# Patient Record
(demographics unavailable — no encounter records)

---

## 2017-02-10 NOTE — UC
Respiratory Complaint HPI





- HPI Summary


HPI Summary: 





cough, scratchy throat, sweats at night and cough worse at night x 6 months.  

No fever.  No sputum. 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  He does not smoke cigarettes. No 

hx of TB and no known exposure to TB. Does not know if he has had BCG.  Pt 

speaks primarily Algerian, although he does speak English.  I also speak and 

understand some Algerian.  Pt chooses not to use  services and states 

he understands me and I in turn voice that I understand him.  There is a male co

-worker also present in the room who witnesses this interaction.  





- History of Current Complaint


Stated Complaint: CONGESTION


Time Seen by Provider: 02/10/17 18:42


Hx Obtained From: Patient, Family/Caretaker - male co-worker


Onset/Duration: Gradual Onset, Lasting Weeks - 6months, Still Present


Timing: Constant


Severity Initially: Moderate


Severity Currently: Moderate


Pain Intensity: 0


Pain Scale Used: 0-10 Numeric


Character: Cough: Nonproductive


Aggravating Factors: Allergens - possible, but OTC allergy meds did not help


Alleviating Factors: Nothing


Associated Signs And Symptoms: Negative: Fever, Chills, Nasal Congestion





- Risk Factors


Pulmonary Embolism Risk Factors: Negative


Cardiac Risk Factors: Negative


Pseudomonas Risk Factors: Negative


Tuberculosis Risk Factors: Negative





- Allergies/Home Medications


Allergies/Adverse Reactions: 


 Allergies











Allergy/AdvReac Type Severity Reaction Status Date / Time


 


antibiotic/unknown Allergy Intermediate Diarrhea Uncoded 02/09/15 17:40














PMH/Surg Hx/FS Hx/Imm Hx


Previously Healthy: Yes





- Surgical History


Surgical History: None





- Family History


Known Family History: 


   Negative: Respiratory Disease





- Social History


Occupation: Employed Full-time - migrant  originally from [facility]


Alcohol Use: None


Substance Use Type: None


Smoking Status (MU): Never Smoked Tobacco





Review of Systems


Constitutional: Other - sweats


Skin: Negative


Eyes: Negative


ENT: Negative


Respiratory: Cough


Cardiovascular: Negative


Gastrointestinal: Negative


Genitourinary: Negative


Motor: Negative


Neurovascular: Negative


Musculoskeletal: Negative


Neurological: Negative


Psychological: Negative


All Other Systems Reviewed And Are Negative: Yes





Physical Exam


Triage Information Reviewed: Yes


Appearance: Well-Appearing, No Pain Distress, Well-Nourished


Vital Signs: 





bradycardia noted, states it is normal for him. 


Vital Signs Reviewed: Yes


Eyes: Positive: Conjunctiva Clear


ENT: Positive: Pharynx normal, TMs normal


Neck: Positive: Supple, Nontender, No Lymphadenopathy


Respiratory: Positive: Chest non-tender, Lungs clear, Normal breath sounds, No 

respiratory distress


Cardiovascular: Positive: No Murmur, Pulses Normal, Brisk Capillary Refill, 

Bradycardia


Musculoskeletal: Positive: Strength Intact, ROM Intact


Neurological: Positive: Alert, Muscle Tone Normal


Psychological Exam: Normal


Skin: Positive: Other - thumbnail on left and index finger on right with brown 

vertical streak on fingernail; no clubbing





Respiratory Course/Dx





- Course


Course Of Treatment: discussed diff dx and possibility of doing xray.  Pt does 

not have health insurance and prefers not to have chest xray done at this time 

after I advised him that the physical exam was normal for the lungs.  We also 

discussed possible TB with cough worse at night and sweats.  Pt has not sputum 

and no fever and no known exposure, has not been back to [facility] for 6 years.

  Pt agrees to try empiric treatment for possible allergies (such as dust and 

mold that he may be exposed to on the farm) and possible bronchospasm/asthma/

bronchitis.  I advised him that if he is no better in 10 days, that he should 

seek medical attention again.  Pt and the coworker voice understanding.  Pt's 

discharge papers are printed in Algerian for him.





- Differential Dx/Diagnosis


Differential Diagnosis/HQI/PQRI: Asthma, Bronchitis, Lower Resp Infection, 

Tuberculosis, Other - allergies


Provider Diagnoses: allergies.  bronchospasm.  asthma.  bronchitis





Discharge





- Discharge Plan


Condition: Stable


Disposition: HOME


Prescriptions: 


Albuterol HFA INHALER* [Ventolin HFA Inhaler*] 2 puff INH Q4H PRN #1 mdi


 PRN Reason: Cough


Azithromycin TAB* [Zithromax TAB (Z-EBER) 250 mg #6 tabs] 2 tab PO .TODAY, THEN 

1 DAILY #1 eber


LevoCETirizine TAB (NF) [Xyzal TAB (NF)] 5 mg PO DAILY #30 tab


predniSONE TAB* [Deltasone TAB*] 40 mg PO DAILY #10 tab


Patient Education Materials:  Asthma (ED), Upper Respiratory Infection (ED), 

Allergies (ED), Bronchospasm (ED)


Print Language: Faroese


Referrals: 


No Primary Care Phys,NOPCP [Primary Care Provider] - 


Additional Instructions: 


We have given you a list of providers that are accepting new patients.  


Dr. Salazar recommends that you should seek follow up medical attention in 10-14 

days if you are not improved.  You should return sooner if you develop new or 

worsening symptoms.

## 2017-11-13 NOTE — UC
Lower Extremity/Ankle HPI





- HPI Summary


HPI Summary: 





25 y/o male presents to the urgent care c/o left lower leg pain and severe 

swelling s/p injury playing soccer yesterday night. Pt reports he was kicked in 

the medial aspect of his lef leg by a player. He applied ice and took an 

ibuprofen PO yesterday.  However this morning he woke up with severe swelling 

and redness around the leg. Pain became worse when he started to walk. Pain is 8

/10 with walking and at touch associated with mild numbness and tingling over 

the foot. Pt denies fever, SOB, chest pain, N/V/D , abdominal pain. 





- History of Current Complaint


Chief Complaint: UCLowerExtremity


Stated Complaint: LEFT LEG SPORTS INJ


Time Seen by Provider: 11/13/17 17:12


Hx Obtained From: Patient


Onset/Duration: Sudden Onset - yesterday night s/p playing soccer, Lasting Days 

- 1 day


Severity Initially: Moderate


Severity Currently: Severe


Pain Intensity: 8


Pain Scale Used: 0-10 Numeric


Aggravating Factor(s): Ambulation


Alleviating Factor(s): Rest


Able to Bear Weight: Yes





- Risk Factors


Gout Risk Factors: Negative


DVT Risk Factors: Negative


Septic Arthritis Risk Factor: Negative





- Allergies/Home Medications


Allergies/Adverse Reactions: 


 Allergies











Allergy/AdvReac Type Severity Reaction Status Date / Time


 


antibiotic/unknown AdvReac Intermediate Diarrhea Uncoded 11/13/17 16:55











Home Medications: 


 Home Medications





Ibuprofen [Ibuprofen 200 MG] 200 mg PO Q6H PRN 11/13/17 [History Confirmed 11/13 /17]


Menthol (Topical Analgesic) [Mineral Ice] 2 % EX DAILY PRN 11/13/17 [History 

Confirmed 11/13/17]


Omeprazole CAP* [Prilosec CAP* 20 MG] 20 mg PO DAILY 11/13/17 [History 

Confirmed 11/13/17]











PMH/Surg Hx/FS Hx/Imm Hx


Previously Healthy: Yes - Pt denies PMHX





- Surgical History


Surgical History: None





- Family History


Known Family History: Positive: Hypertension


   Negative: Respiratory Disease





- Social History


Occupation: Employed Full-time


Lives: With Family


Alcohol Use: None


Substance Use Type: None


Smoking Status (MU): Never Smoked Tobacco





Review of Systems


Constitutional: Negative


Skin: Bruising - medial aspect ot he left lower leg with pain and swelling


Eyes: Negative


ENT: Negative


Respiratory: Negative


Cardiovascular: Negative


Gastrointestinal: Negative


Genitourinary: Negative


Motor: Negative


Neurovascular: Negative


Musculoskeletal: Negative


Neurological: Negative


Psychological: Negative


Is Patient Immunocompromised?: No


All Other Systems Reviewed And Are Negative: Yes





Physical Exam


Triage Information Reviewed: Yes


Appearance: Well-Appearing, No Pain Distress, Well-Nourished - male, Thin


Vital Signs: 


 Initial Vital Signs











Temp  97.7 F   11/13/17 16:47


 


Pulse  51   11/13/17 16:47


 


Resp  18   11/13/17 16:47


 


BP  116/63   11/13/17 16:47


 


Pulse Ox  100   11/13/17 16:47











Vital Signs Reviewed: Yes


Eyes: Positive: Conjunctiva Clear - PERRLA, EOMI, fundi grossly normal


ENT: Positive: Normal ENT inspection, Hearing grossly normal, Pharynx normal, 

TMs normal, Uvula midline


Neck: Positive: Supple, Nontender, No Lymphadenopathy


Respiratory: Positive: Chest non-tender, Lungs clear, Normal breath sounds


Cardiovascular: Positive: RRR, No Murmur, Pulses Normal, Brisk Capillary Refill


Abdomen Description: Positive: Nontender, No Organomegaly, Soft.  Negative: CVA 

Tenderness (R), CVA Tenderness (L)


Bowel Sounds: Positive: Present


Musculoskeletal: Positive: Other: - Medial aspect midportion of the left lower 

leg with severe tenderness on palpation, severe swelling, echymosis with 

brusing around 6cm X 5cm in size. Positive calf tenderness of dorsiflexion. 

FROM of left knee and left ankle, Strength intact, sensation WNL, capillary 

refill brisk, pulses WNL.


Neurological Exam: Normal


Psychological Exam: Normal


Skin Exam: Normal





Lower Extremity Course/Dx





- Course


Course Of Treatment: 25 y/o male presents to the urgent care c/o left lower leg 

pain and severe swelling s/p injury playing soccer yesterday night. Pt reports 

he was kicked in the medial aspect of his lef leg by a player. He applied ice 

and took an ibuprofen PO yesterday.  However this morning he woke up with 

severe swelling and redness around the leg. Pain became worse when he started 

to walk. Pain is 8/10 with walking and at touch associated with mild numbness 

and tingling over the foot. Pt denies fever, SOB, chest pain, N/V/D , abdominal 

pain. Hx obtained. Pt with severe tenderness and swelling and hematoma about 

6cm x 5cn inb size in the medial aspect of the left lower leg and calf 

tenderness on examination. Left lower leg X-ray ordered, Impression: 

Signinficant soft tissue swelling in the proximal midportion of lef lower leg. 

Negative for fracture. Dr Guerrier consulted on Pt's symptoms and he evaluated 

Pt and advised to sent Pt to he [facility] to r/o compartment syndrome. I spoke to 

Noni Downs at the ER and she accepted the Pt. Pt explained on the need to r

/o compartment syndorme and the risks of not going to the ER for further 

evaluation and treatment. Pt understood and agreed with plan of care. Pt is 

accompanied by Employer and he agreed he will take him to the [facility] by car. Pt 

left the clinic ambulating, hemodynamically stable and A&OX3.





- Differential Dx/Diagnosis


Differential Diagnosis/HQI/PQRI: Compartment Syndrome, Contusion, Fracture (

Closed), Sprain, Strain, Tendonitis


Provider Diagnoses: 1- Left lower pain and swelling s/p injury r/o compartment 

syndrome.  2- hematoma





- Physician Notifications


Discussed Patient Care With: Jarad Guerrier - Dr Guerrier agreed with Pt's plan 

of care





Discharge





- Discharge Plan


Condition: Stable


Disposition: TRANS HIGHER [facility] OF CARE FAC


Patient Education Materials:  Leg Pain (ED)


Referrals: 


[facility]ERR[facility] [Outside]


No Primary Care Phys,NOPCP [Primary Care Provider] - 


Additional Instructions: 


-Por favor vaya inmediatamente a la emergencia the [facility] en 

[place]. necesita ser evaluado y descartar la possibilidad de que tenga sindrome 

de copartimiento. Riego de no ir a la emergencia es perder el pie. 


-please go immediately to the [facility] at [place] for further evaluation to r/o 

compartment syndrome. If you don't go there is the risk of loosing you leg.

## 2017-11-13 NOTE — RAD
Indication: LEFT lower leg pain following injury. Kicked playing soccer.



Comparison: No relevant prior exams available on the [facility] PACS for comparison.



Technique: AP and lateral views LEFT lower leg.



REPORT AND IMPRESSION:  Significant medial soft tissue swelling in the proximal to

midportion. No conspicuous foreign body or subcutaneous emphysema. Negative for fracture

or malalignment.

## 2017-11-13 NOTE — RAD
INDICATION: Pain and edema at the LEFT calf following recent injury.



COMPARISON: Tibia fibular radiographs of the same date.



TECHNIQUE:  Gray scale, color Doppler, and spectral analysis of the deep veins of the LEFT

lower extremity. Vessel compression, phasicity, and augmentation assessed.



REPORT: The LEFT common femoral, great saphenous, profunda femoral, femoral, popliteal,

peroneal, and posterior tibial veins are patent. 



Corresponding with the site of injury at the medial calf there is a 5.0 x 1.4 x 4.2 cm

complex thin-walled fluid collection at the interface between the subcutaneous tissue

plane and the muscular fascia most consistent with hematoma given the clinical context.



Patency of the RIGHT common femoral vein documented. 



IMPRESSION: 

1. No evidence for LEFT lower extremity deep venous thrombosis.

2. 5.0 x 1.4 x 4.2 cm probable loculated hematoma at the injury site. Correlate with

clinical assessment. If there is clinical concern for potential abscess needle aspiration

would be suggested.

## 2017-11-14 NOTE — ED
Ravinder NATION Nikita, scribed for Isael Gaines MD on 11/13/17 at 2222 .





Lower Extremity





- HPI Summary


HPI Summary: 





This patient is a 26 year old M presenting to ED with a chief complaint of L 

calf pain since last night. Pt was playing soccer when he accidentally got 

kicked. This morning, the calf was not discolored, but after walking for a bit, 

the area began to show discoloration. The CC is described as swelling, erythema

, and bruising. The patient rates the pain 7/10 in severity. Symptoms 

aggravated by nothing. Symptoms alleviated by nothing. 





- History of Current Complaint


Chief Complaint: EDExtremityLower


Stated Complaint: LT LEG PAIN


Time Seen by Provider: 11/13/17 22:12


Hx Obtained From: Patient


Mechanism Of Injury: Blunt Trauma


Onset/Duration: Still Present


Severity Initially: Moderate


Severity Currently: Moderate


Pain Intensity: 7


Pain Scale Used: 0-10 Numeric


Timing: Constant, Lasting Hours


Location: Is Discrete @ - L calf


Associated Signs And Symptoms: Positive: Other - L calf brusing, swelling and 

erythema


Aggravating Factor(s): Nothing


Alleviating Factor(s): Nothing





- Allergies/Home Medications


Allergies/Adverse Reactions: 


 Allergies











Allergy/AdvReac Type Severity Reaction Status Date / Time


 


antibiotic/unknown AdvReac Intermediate Diarrhea Uncoded 11/13/17 19:17














PMH/Surg Hx/FS Hx/Imm Hx


Endocrine/Hematology History: 


   Denies: Hx Diabetes


Cardiovascular History: 


   Denies: Hx Coronary Artery Disease, Hx Hypertension


Infectious Disease History: No


Infectious Disease History: 


   Denies: Traveled Outside the [place] in Last 30 Days





- Family History


Known Family History: Positive: Hypertension


   Negative: Respiratory Disease





- Social History


Alcohol Use: None


Substance Use Type: Reports: None


Smoking Status (MU): Never Smoked Tobacco





Review of Systems


Positive: Other - L calf pain


Positive: Other - bruising, erythema, and swelling of L calf


All Other Systems Reviewed And Are Negative: Yes





Physical Exam


Triage Information Reviewed: Yes


Vital Signs On Initial Exam: 


 Initial Vitals











Temp Pulse Resp BP Pulse Ox


 


 98.3 F   65   18   125/43   100 


 


 11/13/17 19:12  11/13/17 19:12  11/13/17 19:12  11/13/17 19:12  11/13/17 19:12











Vital Signs Reviewed: Yes


Appearance: Positive: Well-Appearing, No Pain Distress


Skin: Positive: Warm, Dry, Other - 18 x 8 cm ecchymosis on medial aspect of L 

calf, no pallor


Head/Face: Positive: Normal Head/Face Inspection


Eyes: Positive: EOMI, EVETTE


ENT: Positive: Normal ENT inspection


Neck: Positive: Supple, Nontender


Respiratory/Lung Sounds: Positive: Clear to Auscultation, Breath Sounds Present


Cardiovascular: Positive: RRR, Other - good dorsalis pedis pulses, normal 

capillary refills


Abdomen Description: Positive: Nontender, Soft


Bowel Sounds: Positive: Present


Musculoskeletal: Positive: Normal, Strength/ROM Intact


Neurological: Positive: Normal, Sensory/Motor Intact, Alert, Oriented to Person 

Place, Time, Other - no pain with passive ROM in ankle; medial aspect of L calf 

is tender to palpation, lateral aspect is non-tender and soft


Psychiatric: Positive: Affect/Mood Appropriate





Diagnostics





- Vital Signs


 Vital Signs











  Temp Pulse Resp BP Pulse Ox


 


 11/13/17 19:12  98.3 F  65  18  125/43  100














- Laboratory


Lab Statement: Any lab studies that have been ordered have been reviewed, and 

results considered in the medical decision making process.





- Ultrasound


  ** No standard instances


Ultrasound Interpretation Completed By: Radiologist - Venous Doppler study 

reveals 1. No evidence for LEFT lower extremity deep venous thrombosis. 2. 5.0 

x 1.4 x 4.2 cm probable loculated hematoma at the injury site. Correlate with 

clinical assessment. If there is clinical concern for potential abscess needle 

aspiration would be suggested. ED physician has reviewed this radiology report 

and agrees.





Re-Evaluation





- Re-Evaluation


  ** First Eval


Re-Evaluation Time: 23:02


Comment: Discussed with pt about plan to discharge. Recommends that the pt 

follow up with Dr. Barry.





Lower Extremity Course/Dx





- Course


Assessment/Plan: This patient is a 26 year old M presenting to ED with a chief 

complaint of L calf pain since last night. Pt was playing soccer when he 

accidentally got kicked. This morning, the calf was not discolored, but after 

walking for a bit, the area began to show discoloration. The CC is described as 

swelling, erythema, and bruising. The patient rates the pain 7/10 in severity. 

Symptoms aggravated by nothing. Symptoms alleviated by nothing. Medications 

reviewed. Allergies noted. Venous Doppler study reveals 1. No evidence for LEFT 

lower extremity deep venous thrombosis. 2. 5.0 x 1.4 x 4.2 cm probable 

loculated hematoma at the injury site. Correlate with clinical assessment. If 

there is clinical concern for potential abscess needle aspiration would be 

suggested. ED physician has reviewed this radiology report and agrees. 

Consulted Dr. Barry at 2248 who agrees that the pt's condition does not sound 

like compartment syndrome. Agrees to follow up with the pt. Pt will be 

discharged. Pt is agreeable with this plan.  PAIN/SWELLING IS ONLY ON MEDIAL 

ASPECT. THE POSTERIOR AND LATERAL CALF IS SOFT AND NON TENDER. THERE IS NO 

NEUROLOGIC DEFICIT. NORMAL DP/PT PULSE. NORMAL CAP REFILL DISTALLY. NO PAIN IN 

THE CALF WITH ANKLE PASSIVE ROM.  THIS WAS DISCUSSED WITH ORTHOPEDICS ON CALL, 

DR BARRY.  HEMATOMA ON U/S. NO CLINICAL SIGNS OF COMPARTMENT SYNDROME AT THIS 

TIME.  THIS WAS ALL DISCUSSED WITH THE PATIENT.  F/U ORTHO; RETURN IF WORSE.





- Diagnoses


Provider Diagnoses: 


 Hematoma of left lower extremity








- Physician Notifications


Discussed Care Of Patient With: Brown Barry


Time Discussed With Above Provider: 22:48


Instructed by Provider To: Other - Consulted Dr. Barry who agrees that the pt'

s condition does not sound like compartment syndrome. Agrees to follow up with 

the pt.





Discharge





- Discharge Plan


Condition: Stable


Disposition: HOME


Patient Education Materials:  Hematoma (ED)


Forms:  *Work Release


Referrals: 


[facility] [Outside]


Brown Barry MD [Medical Doctor] - 


No Primary Care Phys,NOPCP [Primary Care Provider] - 


Additional Instructions: 


FOLLOW UP WITH ORTHOPEDICS, DR BARRY.


REST AND ELEVATE THE LEG.


TAKE IBUPROFEN 600MG EVERY 6 HOURS AS NEEDED.


RETURN TO THE EMERGENCY DEPARTMENT FOR ANY WORSENING OF YOUR CONDITION; NUMBNESS

, LOSS OF BLOOD CIRCULATION, PAIN WITH PASSIVE MOTION, FEVER OR QUESTIONS OR 

CONCERNS.





The documentation as recorded by the Ravinder ordaz Nikita accurately reflects the 

service I personally performed and the decisions made by me, Isael Gaines MD.

## 2018-07-20 NOTE — RAD
INDICATION: Right elbow injury



COMPARISON: None



TECHNIQUE: AP, lateral, and oblique views were obtained.



FINDINGS: The bony structures, joint spaces, and soft tissues are normal for age.



IMPRESSION: NO ACUTE BONY FINDINGS.

## 2018-07-20 NOTE — UC
Elbow Pain





- HPI Summary


HPI Summary: 





right elbow pain x 10 days


s/p fall on his right elbow 10 days ago 


not the back of the elbow is swollen, red, painful and darning fluid 


no fever, no chills  





- History of Current Complaint


Chief Complaint: UCUpperExtremity


Stated Complaint: RT ELBOW COMP


Time Seen by Provider: 07/20/18 15:21


Hx Obtained From: Patient


Onset/Duration: Days - 10


Severity Currently: Moderate


Pain Intensity: 0


Location Of Pain: Is Discrete @ - right elbow


Character: Aching, Throbbing


Aggravating Factor(s): Movement, Pulling, Twisting


Alleviating Factor(s): Nothing


Associated Signs And Symptoms: Positive: Swelling, Redness





- Allergies/Home Medications


Allergies/Adverse Reactions: 


 Allergies











Allergy/AdvReac Type Severity Reaction Status Date / Time


 


antibiotic/unknown AdvReac Intermediate Diarrhea Uncoded 07/20/18 15:16














PMH/Surg Hx/FS Hx/Imm Hx


Previously Healthy: Yes





- Surgical History


Surgical History: None





- Family History


Known Family History: Positive: Hypertension


   Negative: Respiratory Disease





- Social History


Alcohol Use: None


Substance Use Type: None


Smoking Status (MU): Never Smoked Tobacco





Review of Systems


Constitutional: Negative


Skin: Negative


Eyes: Negative


ENT: Negative


Respiratory: Negative


Is Patient Immunocompromised?: No


All Other Systems Reviewed And Are Negative: Yes





Physical Exam


Triage Information Reviewed: Yes


Appearance: Well-Appearing, No Pain Distress, Well-Nourished


Vital Signs: 


 Initial Vital Signs











Temp  98.3 F   07/20/18 15:17


 


Pulse  56   07/20/18 15:17


 


Resp  18   07/20/18 15:17


 


BP  113/60   07/20/18 15:17


 


Pulse Ox  99   07/20/18 15:17











Vital Signs Reviewed: Yes


Eyes: Positive: Conjunctiva Clear


ENT: Positive: Normal ENT inspection, Hearing grossly normal, Pharynx normal


Neck: Positive: Supple, Nontender, No Lymphadenopathy


Respiratory: Positive: Chest non-tender, Lungs clear, Normal breath sounds


Cardiovascular: Positive: RRR, No Murmur, Pulses Normal


Musculoskeletal: Positive: Other: - right elbow : + abrasion + olecranon 

busitis , + pain and swellign, + erythema, tender to touch + discharge





Elbow Pain Course/Dx





- Differential Dx/Diagnosis


Provider Diagnoses: right elbow bursitis





Discharge





- Sign-Out/Discharge


Documenting (check all that apply): Patient Departure





- Discharge Plan


Condition: Stable


Disposition: HOME


Prescriptions: 


Cephalexin CAP* [Keflex CAP*] 500 mg PO TID #30 cap


Patient Education Materials:  Elbow Bursitis (ED)


Referrals: 


No Primary Care Phys,NOPCP [Primary Care Provider] - 7 Days





- Billing Disposition and Condition


Condition: STABLE


Disposition: Home

## 2018-08-22 NOTE — RAD
HISTORY: Acute RT foot pain



COMPARISONS: None



VIEWS: 3 , Frontal, lateral, and oblique views of the right foot 



FINDINGS:



BONE DENSITY: Normal.

BONES: There is no displaced fracture.

JOINTS: There is no arthropathy.

ALIGNMENT: There is no dislocation. 

SOFT TISSUES: Unremarkable.



OTHER FINDINGS: None.



IMPRESSION: 

NO ACUTE OSSEOUS INJURY. IF SYMPTOMS PERSIST, RECOMMEND REPEAT IMAGING.

## 2018-08-22 NOTE — RAD
Indication: Right calf pain..



Duplex Doppler sonography of the deep venous system of the right lower extremity deep

venous system was performed.



Bilaterally the common femoral veins appear patent and compressible. Right proximal

greater saphenous vein, proximal deep femoral vein, femoral vein, popliteal vein,

posterior tibial veins and peroneal veins appear patent and compressible.



IMPRESSION: NO EVIDENCE OF DEEP VENOUS THROMBOSIS IS IDENTIFIED.

## 2018-08-22 NOTE — UC
Lower Extremity/Ankle HPI





- HPI Summary


HPI Summary: 





26 y/o [place] speaking male presents to the urgent care c/o RT mid foot pain w

/ weight bearing since he woke up for the past 2 days. Pt als has posterior Rt 

lower leg pain , sharp when he walks. Pain is 8/10 sharp, Today he  is walking w

/ limping. There is mild swelling over the dorsal side off w/o any ecchymosis 

or erythema. Pt doesn't recall any injury. Pt works in a [employer] and he 

is accompany by someone from the farm. Pt has taking Ibuprofen 200mg PO last 

night to alleviate symptoms. Pt denies numbness or tingling sensation over the 

lower extremity, fever, SOB, chest pain,palpitations, abdominal pain, N/V/D. 








- History of Current Complaint


Chief Complaint: UCLowerExtremity


Stated Complaint: RIGHT FOOT COMPLAINT


Time Seen by Provider: 08/22/18 10:04


Hx Obtained From: Patient


Onset/Duration: Sudden Onset, Lasting Days - 2 days, Still Present, Worse Since 

- today


Severity Initially: Mild


Severity Currently: Moderate


Pain Intensity: 8


Pain Scale Used: 0-10 Numeric


Aggravating Factor(s): Standing, Ambulation


Alleviating Factor(s): Rest, OTC Meds


Able to Bear Weight: Yes





- Risk Factors


Gout Risk Factors: Negative


DVT Risk Factors: Negative


Septic Arthritis Risk Factor: Negative





- Allergies/Home Medications


Allergies/Adverse Reactions: 


 Allergies











Allergy/AdvReac Type Severity Reaction Status Date / Time


 


cephalexin Allergy  Oral Verified 08/22/18 09:51





   Blisters  


 


antibiotic/unknown AdvReac Intermediate Diarrhea Uncoded 08/22/18 09:51














PMH/Surg Hx/FS Hx/Imm Hx


Previously Healthy: Yes - Pt denies pMHX





- Surgical History


Surgical History: None





- Family History


Known Family History: Positive: Hypertension


   Negative: Respiratory Disease





- Social History


Occupation: Employed Full-time


Lives: With Family


Alcohol Use: None


Substance Use Type: None


Smoking Status (MU): Never Smoked Tobacco





Review of Systems


Constitutional: Negative


Skin: Other - mild swelling over the dorsal RT foot


Eyes: Negative


ENT: Negative


Respiratory: Negative


Cardiovascular: Negative


Gastrointestinal: Negative


Genitourinary: Negative


Motor: Negative


Neurovascular: Negative


Musculoskeletal: Decreased ROM - RT foot, Other: - RT foot and RT lower leg


Neurological: Negative


Psychological: Negative


Is Patient Immunocompromised?: No


All Other Systems Reviewed And Are Negative: Yes





Physical Exam





- Summary


Physical Exam Summary: 





Vital Signs Reviewed: Yes


Appearance: Well-Appearing, No Pain Distress, Well-Nourished, male  sitting in 

the examining table w/o any apparent pain distress


Eyes: Positive: Conjunctiva Clear - PERRLA< RICKIE, fundi grossly WNL


ENT: Positive: Normal ENT inspection, Hearing grossly normal, Pharynx normal, 

TMs normal, Uvula midline


Neck: Positive: Supple, Nontender, No Lymphadenopathy


Respiratory: Positive: Chest non-tender, Lungs clear, Normal breath sounds, No 

respiratory distress


Cardiovascular: Positive: RRR, No Murmur, Pulses Normal, Brisk Capillary Refill


Abdomen Description: Positive: Nontender, No Organomegaly, Soft. Negative: CVA 

Tenderness (R), CVA Tenderness (L)


Bowel Sounds: Positive: Present


Extremities: Pt is able to bear weight but ambulate with mild limping.R 

extremity ithout deformity or asymmetry when compared to the L. No soft tissue 

swelling or edema. No overlying erythema, warmth, discoloration. No lesions or 

break in skin integrity. Diameter of calves 14cm bilateraaly . Soft tissues of 

posterior lower legs are soft, supple, nontender and no palpable cords or 

evidence of thrombophlebitis. No evidence of gangrene or compartment syndrome. 

Medial thigh is without soft tissue swelling or tender to palpation. Negative 

Homans sign positive.. No proximal lymphangitis or lymphadenopathy.. RT foot : 

Positive point tenderness over the dorsal side at the base on the 3rd and 4th 

metatarsal w/ mild swelling and erythema. Decrease ROM on flexion and extension

, No surface trauma, The R foot is without obvious asymmetry or deformity when 

compared to the L foot. No bony step-off, No tenderness to palpation over toes,

  no tenderness of hindfoot, Decrease plantar/dorsiflexion, inversion/eversion 

due to pain. Distal motor and neurovascular status are intact.


Neurological Exam: Normal


Psychological Exam: Normal


Skin Exam: Normal








Triage Information Reviewed: Yes


Vital Signs: 


 Initial Vital Signs











Temp  98 F   08/22/18 09:48


 


Pulse  67   08/22/18 09:48


 


Resp  15   08/22/18 09:48


 


BP  126/57   08/22/18 09:48


 


Pulse Ox  99   08/22/18 09:48














Lower Extremity Course/Dx





- Course


Course Of Treatment: 26 y/o [place] speaking male presents to the urgent care c/

o RT mid foot pain w/ weight bearing since he woke up for the past 2 days. Pt 

als has posterior Rt lower leg pain , sharp when he walks. Pain is 8/10 sharp, 

Today he  is walking w/ limping. There is mild swelling over the dorsal side 

off w/o any ecchymosis or erythema. Pt doesn't recall any injury. Pt works in a 

[employer] [employer] and he is accompany by someone from the farm. Pt has taking 

Ibuprofen 200mg PO last night to alleviate symptoms. Pt denies numbness or 

tingling sensation over the lower extremity, fever, SOB, chest pain,palpitations

, abdominal pain, N/V/D. Hx obtained. PE performed,.  RT foot X-ray ordered, 

Impression:There was no fracture, dislocation, soft tissue swelling or FB 

noted. Duplex US ordered to r/o DVT: Negative.  Probably RT foot sprain. Pt's 

foot immobilized with ace-bandage and given a post-op shoe to avoid flexion. 

Advised RICE, and Rx Ibuprofen PO for pain and swelling, Use crutches he has at 

home to avoid weight berain until symptoms resolve.  If not improvement of 

symptoms to f/u with Orthopedic DR Nunez referral for further evaluation and 

treatment. Pt understood and agreed with D/C instructions. Pt left clinic 

ambulating, and hemodynamically stable.





- Differential Dx/Diagnosis


Differential Diagnosis/HQI/PQRI: Compartment Syndrome, Contusion, Fracture (

Closed), Gout, Sprain, Strain, Tendonitis


Provider Diagnoses: 1- Acute RT Lower leg and foot pain.  2- RT fopot sprain





Discharge





- Sign-Out/Discharge


Documenting (check all that apply): Patient Departure - D/C home


All imaging exams completed and their final reports reviewed: Yes





- Discharge Plan


Condition: Stable


Disposition: HOME


Prescriptions: 


Ibuprofen TAB* [Motrin TAB* 800 MG] 800 mg PO Q6H PRN #30 tab


 PRN Reason: Pain


Patient Education Materials:  Foot Sprain (ED), Leg Pain (ED)


Referrals: 


[facility] [place] [facility] [Outside] - 1 Week


Azael Nunez MD [Medical Doctor] - 1 Week


Additional Instructions: 


1-Please take medications as directed  after meals to alleviate pain and 

swelling.


2-Please apply ice, keep your foot immobilized with the Ace bandage and post-op 

shoe. Avoid strenuous exercise or standing for long periods of time, use the 

crutches you have at home to avoid weight bearing


3- Please f/u with your PCP or Orthopedic DR Nunez in 1 week inf not 

improvement of symptoms for further evaluation and treatment.








- Billing Disposition and Condition


Condition: STABLE


Disposition: Home





Attestation Statement


User Type: Provider

## 2019-03-19 NOTE — UC
Lower Extremity/Ankle HPI





- HPI Summary


HPI Summary: 





Intermittent right foot pain since August





Started again  three days ago





10/10 with wt bearing





none at rest





prior episodes have involved big toe





- History of Current Complaint


Chief Complaint: UCLowerExtremity


Stated Complaint: RIGHT FOOT CONCERN


Time Seen by Provider: 03/19/19 15:31


Hx Obtained From: Patient


Onset/Duration: Gradual Onset, Lasting Days


Severity Initially: Severe


Severity Currently: Severe


Pain Intensity: 10


Pain Scale Used: 0-10 Numeric


Aggravating Factor(s): Standing, Ambulation


Alleviating Factor(s): Rest


Able to Bear Weight: Yes


Feet (Multiple View): 


  __________________________














  __________________________





 1 - pain and swelling here








- Allergies/Home Medications


Allergies/Adverse Reactions: 


 Allergies











Allergy/AdvReac Type Severity Reaction Status Date / Time


 


cephalexin Allergy  Oral Verified 03/19/19 14:48





   Blisters  


 


antibiotic/unknown AdvReac Intermediate Diarrhea Uncoded 03/19/19 14:48











Home Medications: 


 Home Medications





Ibuprofen TAB* [Motrin TAB* 800 MG] 400 mg PO Q6H PRN 03/19/19 [History]











PMH/Surg Hx/FS Hx/Imm Hx


Previously Healthy: Yes





- Surgical History


Surgical History: None





- Family History


Known Family History: Positive: Hypertension, Diabetes


   Negative: Respiratory Disease





- Social History


Alcohol Use: None


Substance Use Type: None


Smoking Status (MU): Never Smoked Tobacco





Review of Systems


All Other Systems Reviewed And Are Negative: Yes


Constitutional: Positive: Negative


Skin: Positive: Negative


Eyes: Positive: Negative


ENT: Positive: Negative


Respiratory: Positive: Negative


Cardiovascular: Positive: Negative


Gastrointestinal: Positive: Negative


Genitourinary: Positive: Negative


Motor: Positive: Negative


Neurovascular: Positive: Negative


Musculoskeletal: Positive: Arthralgia


Neurological: Positive: Negative


Psychological: Positive: Negative





Physical Exam


Triage Information Reviewed: Yes


Appearance: Well-Appearing, No Pain Distress, Well-Nourished


Vital Signs: 


 Initial Vital Signs











Temp  97.7 F   03/19/19 14:42


 


Pulse  53   03/19/19 14:42


 


Resp  16   03/19/19 14:42


 


BP  114/60   03/19/19 14:42


 


Pulse Ox  99   03/19/19 14:42











Vital Signs Reviewed: Yes


Eyes: Positive: Conjunctiva Clear


ENT: Positive: Hearing grossly normal.  Negative: Nasal congestion, Nasal 

drainage, Tonsillar exudate, Trismus, Hoarse voice


Dental Exam: Normal


Neck: Positive: Supple


Respiratory: Positive: Lungs clear, Normal breath sounds, No respiratory 

distress


Cardiovascular Exam: Normal


Cardiovascular: Positive: RRR, No Murmur


Musculoskeletal: Positive: ROM Intact, Other: - antalgic gait, see image


Neurological Exam: Normal


Neurological: Positive: Alert


Psychological Exam: Normal


Skin Exam: Normal





Diagnostics





- Radiology


  ** No standard instances


Radiology Interpretation Completed By: Radiologist


Summary of Radiographic Findings: neg





Lower Extremity Course/Dx





- Differential Dx/Diagnosis


Provider Diagnosis: 


 Right foot pain, Gout attack








Discharge





- Sign-Out/Discharge


Documenting (check all that apply): Patient Departure


All imaging exams completed and their final reports reviewed: Yes





- Discharge Plan


Condition: Stable


Disposition: HOME


Prescriptions: 


predniSONE [Prednisone 20 MG TAB] 60 mg PO DAILY #6 tab


Patient Education Materials:  Low Purine Diet (ED), Gout (ED)


Print Language: Citizen of [place]


Forms:  *Work Release


Referrals: 


Azael Nunez MD [Medical Doctor] - 5 Days


Additional Instructions: 


You MAY have gout





I suggest you get rechecked next week





blood work is pending





you may use your ace wrap and post-op shoe











- Billing Disposition and Condition


Condition: STABLE


Disposition: Home